# Patient Record
Sex: MALE | Race: ASIAN
[De-identification: names, ages, dates, MRNs, and addresses within clinical notes are randomized per-mention and may not be internally consistent; named-entity substitution may affect disease eponyms.]

---

## 2019-01-21 ENCOUNTER — HOSPITAL ENCOUNTER (OUTPATIENT)
Dept: HOSPITAL 72 - RAD | Age: 34
Discharge: HOME | End: 2019-01-21
Payer: COMMERCIAL

## 2019-01-21 DIAGNOSIS — R05: ICD-10-CM

## 2019-01-21 DIAGNOSIS — J06.9: Primary | ICD-10-CM

## 2019-01-21 PROCEDURE — 71046 X-RAY EXAM CHEST 2 VIEWS: CPT

## 2019-01-21 NOTE — DIAGNOSTIC IMAGING REPORT
Indication: Cough

 

Technique: XRAY Chest 2v

 

Comparison: None

 

Findings: Heart size and mediastinal contours are within normal limits. There is no

focal airspace consolidation. No silhouetting of the heart border or diaphragm. There

is no pleural effusion or pneumothorax. Pulmonary vascularity within normal limits.

No acute osseous abnormality identified.

 

IMPRESSION: 

No radiographic evidence of acute cardiopulmonary disease. Specifically, no focal

airspace consolidation as questioned clinically.